# Patient Record
Sex: MALE | Race: WHITE
[De-identification: names, ages, dates, MRNs, and addresses within clinical notes are randomized per-mention and may not be internally consistent; named-entity substitution may affect disease eponyms.]

---

## 2017-12-28 ENCOUNTER — HOSPITAL ENCOUNTER (EMERGENCY)
Dept: HOSPITAL 10 - FTE | Age: 32
Discharge: HOME | End: 2017-12-28
Payer: MEDICAID

## 2017-12-28 VITALS
WEIGHT: 181.88 LBS | BODY MASS INDEX: 24.63 KG/M2 | BODY MASS INDEX: 24.63 KG/M2 | HEIGHT: 72 IN | HEIGHT: 72 IN | WEIGHT: 181.88 LBS

## 2017-12-28 DIAGNOSIS — S61.412A: Primary | ICD-10-CM

## 2017-12-28 DIAGNOSIS — S61.012A: ICD-10-CM
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
## 2017-12-28 DIAGNOSIS — F17.210: ICD-10-CM
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
## 2017-12-28 DIAGNOSIS — Z23: ICD-10-CM

## 2017-12-28 PROCEDURE — 12001 RPR S/N/AX/GEN/TRNK 2.5CM/<: CPT

## 2017-12-28 PROCEDURE — 90471 IMMUNIZATION ADMIN: CPT

## 2017-12-28 PROCEDURE — 90715 TDAP VACCINE 7 YRS/> IM: CPT

## 2017-12-28 NOTE — ERD
ER Documentation


Chief Complaint


Chief Complaint


Lt thumb and left third finger lac s/p opening a can





HPI


32-year-old male presents here to emergency department for complaints of a left 

thumb laceration wound and left third finger laceration wound after touching an 

edge of open can while opening it today.  Patient's complaint of pain sharp pain

, 6/10 scale, as was upon touching the area.  Patient's laceration when is 

still bleeding on the third finger but the thumb laceration wound has stopped.  

Patient does not have any joint involvement.  Patient is able to move the 

joints without problems.  Unknown last tetanus immunization.





ROS


All systems reviewed and are negative except as per history of present illness.





Medications


Home Meds


Reported Medications


[none] Unknown Strength  No Conflict Check


   12/28/17





Allergies


Allergies:  


Coded Allergies:  


     No Known Allergy (Unverified , 12/28/17)





PMhx/Soc


Last tetanus immunization


Medical and Surgical Hx:  pt denies Medical Hx, pt denies Surgical Hx


History of Surgery:  No


Anesthesia Reaction:  No


Hx Neurological Disorder:  No


Hx Respiratory Disorders:  No


Hx Cardiac Disorders:  No


Hx Psychiatric Problems:  No


Hx Miscellaneous Medical Probl:  No


Hx Alcohol Use:  No


Hx Substance Use:  No


Hx Tobacco Use:  Yes (3 CIGS/ DAY )


Smoking Status:  Current every day smoker





FmHx


Family History:  No coronary disease, No diabetes, No other





Physical Exam


Vitals





Vital Signs








  Date Time  Temp Pulse Resp B/P Pulse Ox O2 Delivery O2 Flow Rate FiO2


 


12/28/17 00:59 98.2 74 18 137/93 99   








Physical Exam


GENERAL:  The patient is well developed and appropriate for usual state of 

health, in no apparent distress.


CHEST:  Clear to auscultation bilaterally. There are no rales, wheezes or 

rhonchi. 


HEART:  Regular rate and rhythm. No murmurs, clicks, rubs or gallops. No S3 or 

S4.


ABDOMEN:  Soft, nontender and nondistended. Good bowel sounds. No rebound or 

guarding. No gross peritonitis. No gross organomegaly or masses. No Vu sign 

or McBurney point tenderness.


BACK:  No midline or flank tenderness.


EXTREMITIES:  Equal pulses bilaterally. There is no peripheral clubbing, 

cyanosis or edema. No focal swelling or erythema. Full range of motion. Grossly 

neurovascularly intact.


NEURO:  Alert and oriented. Cranial nerves 2-12 intact. Motor strength in all 4 

extremities with 5/5 strength.  Sensation grossly intact. Normal speech and 

gait. 


SKIN: Noted 1.5 cm laceration wound noted in the palmar aspect of the left 

third finger, bleeding at this time, laceration wound on the left thumb noted 

to be 2 cm very superficial, well approximated and not bleeding.  There is no 

apparent rash or petechia. The skin is warm and dry.


HEMATOLOGIC AND LYMPHATIC:  There is no evidence of excessive bruising or 

lymphedema. No gross cervical, axillary, or inguinal lymphadenopathy.


Results 24 hrs





 Current Medications








 Medications


  (Trade)  Dose


 Ordered  Sig/Nancy


 Route


 PRN Reason  Start Time


 Stop Time Status Last Admin


Dose Admin


 


 Diphtheria/


 Tetanus/Acell


 Pertussis


  (Adacel)  0.5 ml  ONCE ONCE


 IM*


   12/28/17 05:00


 12/28/17 05:01 DC 12/28/17 05:01


 


 


 Lidocaine


  (Xylocaine 1%


  (Mdv) 20 ml)  2 ml  ONCE  ONCE


 SC


   12/28/17 05:00


 12/28/17 05:01 DC  


 





Tdap was given to prevent tetanus. Patient tolerated medication well.





Procedures/MDM


Procedure Note: 


After obtaining informed consent, the wound was irrigated with 250 ml of normal 

saline and cleaned with diluted betadine. Using aseptic  technique, 3 ml of 1% 

lidocaine was injected on the subcutaneous tissue of the laceration wound for 

anesthetic.  After the anesthetic, the wound was approximated using  4 

interrupted sutures of 6-0 Prolene. After the procedure, the wound was well 

approximated. Patient tolerated procedure well. Bacitracin was applied on the 

area and a dry dressing.








Medical Decision Making: Patient's pain is most likely consistent with the 

laceration on affected area which was repaired without any difficulty.  There 

is no suspicion for neurovascular compromise. Patient has intact sensation and 

circulation of the affected extremity. There is low  suspicion for septic 

arthritis. Patient does not have any fever.  Radiology exams not indicated at 

this time





Disposition: Home. Patient is given prescription for ibuprofen for pain, Keflex 

to prevent infection. Patient was advised to elevate the affected area and 

apply ice on  affected area. Patient was advised that if symptoms are worse, 

numbness, tingling, high fever, unable to move joint, worsening symptoms, to 

return to emergency department immediately. Otherwise, patient is advised to 

follow up with the primary care doctor or any clinic in 2 days for recheck, 

suture removal in 7-10 days.








Disclaimer: Inadvertent spelling and grammatical errors are likely due to EHR/

dictation software use and do not reflect on the overall quality of patient 

care. Also, please note that the electronic time recorded on this note does not 

necessarily reflect the actual time of the patient encounter.





Departure


Diagnosis:  


 Primary Impression:  


 Hand laceration


 Encounter type:  initial encounter  Foreign body presence:  without foreign 

body  Laterality:  left  Qualified Code:  S61.412A - Laceration of left hand 

without foreign body, initial encounter


Condition:  Stable


Patient Instructions:  Laceration, Hand





Additional Instructions:  


 Patient is given prescription for ibuprofen for pain, Keflex to prevent 

infection. Patient was advised to elevate the affected area and apply ice on  

affected area. Patient was advised that if symptoms are worse, numbness, 

tingling, high fever, unable to move joint, worsening symptoms, to return to 

emergency department immediately. Otherwise, patient is advised to follow up 

with the primary care doctor or any clinic in 2 days for recheck, suture 

removal in 7-10 days.











ONIEL OAKES NP Dec 28, 2017 05:05

## 2017-12-30 ENCOUNTER — HOSPITAL ENCOUNTER (EMERGENCY)
Dept: HOSPITAL 91 - FTE | Age: 32
Discharge: HOME | End: 2017-12-30
Payer: MEDICAID

## 2017-12-30 ENCOUNTER — HOSPITAL ENCOUNTER (EMERGENCY)
Age: 32
Discharge: HOME | End: 2017-12-30

## 2017-12-30 DIAGNOSIS — Z48.01: Primary | ICD-10-CM

## 2017-12-30 DIAGNOSIS — F17.210: ICD-10-CM

## 2017-12-30 PROCEDURE — 99281 EMR DPT VST MAYX REQ PHY/QHP: CPT

## 2018-01-11 ENCOUNTER — HOSPITAL ENCOUNTER (EMERGENCY)
Dept: HOSPITAL 91 - E/R | Age: 33
Discharge: HOME | End: 2018-01-11
Payer: MEDICAID

## 2018-01-11 ENCOUNTER — HOSPITAL ENCOUNTER (EMERGENCY)
Age: 33
Discharge: HOME | End: 2018-01-11

## 2018-01-11 DIAGNOSIS — F17.210: ICD-10-CM

## 2018-01-11 DIAGNOSIS — Z48.02: Primary | ICD-10-CM

## 2018-01-11 PROCEDURE — 99281 EMR DPT VST MAYX REQ PHY/QHP: CPT
